# Patient Record
Sex: MALE | Race: WHITE | Employment: FULL TIME | URBAN - METROPOLITAN AREA
[De-identification: names, ages, dates, MRNs, and addresses within clinical notes are randomized per-mention and may not be internally consistent; named-entity substitution may affect disease eponyms.]

---

## 2022-07-26 ENCOUNTER — INITIAL CONSULT (OUTPATIENT)
Dept: CARDIOLOGY CLINIC | Age: 62
End: 2022-07-26
Payer: COMMERCIAL

## 2022-07-26 VITALS
HEIGHT: 74 IN | DIASTOLIC BLOOD PRESSURE: 72 MMHG | BODY MASS INDEX: 34.27 KG/M2 | WEIGHT: 267 LBS | HEART RATE: 66 BPM | SYSTOLIC BLOOD PRESSURE: 170 MMHG

## 2022-07-26 DIAGNOSIS — R07.2 PRECORDIAL PAIN: ICD-10-CM

## 2022-07-26 DIAGNOSIS — I49.3 PVC'S (PREMATURE VENTRICULAR CONTRACTIONS): ICD-10-CM

## 2022-07-26 DIAGNOSIS — I10 ESSENTIAL HYPERTENSION, BENIGN: ICD-10-CM

## 2022-07-26 DIAGNOSIS — R00.1 BRADYCARDIA: ICD-10-CM

## 2022-07-26 DIAGNOSIS — Z76.89 ESTABLISHING CARE WITH NEW DOCTOR, ENCOUNTER FOR: Primary | ICD-10-CM

## 2022-07-26 PROCEDURE — 93000 ELECTROCARDIOGRAM COMPLETE: CPT | Performed by: INTERNAL MEDICINE

## 2022-07-26 PROCEDURE — 99205 OFFICE O/P NEW HI 60 MIN: CPT | Performed by: INTERNAL MEDICINE

## 2022-07-26 RX ORDER — METFORMIN HYDROCHLORIDE 500 MG/1
TABLET, EXTENDED RELEASE ORAL
COMMUNITY
Start: 2022-07-19

## 2022-07-26 RX ORDER — FLUTICASONE PROPIONATE 50 MCG
SPRAY, SUSPENSION (ML) NASAL
COMMUNITY
Start: 2022-05-02

## 2022-07-26 RX ORDER — LISINOPRIL 10 MG/1
TABLET ORAL
COMMUNITY
Start: 2022-05-25

## 2022-07-26 RX ORDER — MONTELUKAST SODIUM 10 MG/1
10 TABLET ORAL DAILY
COMMUNITY
Start: 2021-08-30

## 2022-07-26 RX ORDER — METOPROLOL SUCCINATE 25 MG/1
TABLET, EXTENDED RELEASE ORAL
COMMUNITY
Start: 2022-07-21 | End: 2022-09-19 | Stop reason: SDUPTHER

## 2022-07-26 RX ORDER — AMLODIPINE BESYLATE 10 MG/1
TABLET ORAL
COMMUNITY
Start: 2022-07-06

## 2022-07-26 RX ORDER — LORATADINE AND PSEUDOEPHEDRINE 10; 240 MG/1; MG/1
1 TABLET, EXTENDED RELEASE ORAL
COMMUNITY
End: 2022-09-12

## 2022-07-26 NOTE — PROGRESS NOTES
[facility] CARDIOLOGY  [address], [address]  PHONE: [phone]         CONSULT        22      NAME:  Maria Isabel Stephens  : 1960  MRN: [id number]      SUBJECTIVE:   Maria Isabel Stephens is a 60 y.o. male seen for a consultation visit regarding the following:     Chief Complaint   Patient presents with    Consultation     Bradycardia            HPI:    3months 69-year-old gentleman at [facility] physical found to have low heart rate and frequent PVCs multiple EKGs reviewed show frequent PVCs and at times be bigeminy with rare couplets. He has had no dizziness lightheadedness or syncope. Metoprolol was started and EKG was repeated today and he is still in ventricular bigeminy. He has no syncope. He has had some chest pain for the last week. It is retrosternal lasting hours at a time both with exertion and at rest.  There is no associated symptoms. He thinks its been present since starting the metoprolol. Allergies   Allergen Reactions    Aspirin Anaphylaxis    Penicillins Anaphylaxis, Hives and Rash    Sulfa Antibiotics Hives     Past Medical History:   Diagnosis Date    Hypertension      No past surgical history on file. Family History   Problem Relation Age of Onset    Heart Attack Paternal Grandmother 28     Social History     Tobacco Use    Smoking status: Never    Smokeless tobacco: Never   Substance Use Topics    Alcohol use: Not on file           ROS:    Constitution: Negative for fever. Eyes: Negative for blurred vision. Respiratory: Negative for cough. Endocrine: Negative for cold intolerance and heat intolerance. Skin: Negative for rash. Musculoskeletal: Negative for myalgias. Gastrointestinal: Negative for diarrhea, nausea and vomiting. Genitourinary: Negative for dysuria. Neurological: Negative for headaches and numbness.           PHYSICAL EXAM:     BP (!) 170/72   Pulse 66   Ht 6' 2\" (1.88 m)   Wt 267 lb (121.1 kg)   BMI 34.28 kg/m²    Constitutional: Oriented to person, place, and time. Appears well-developed and well-nourished. Head: Normocephalic and atraumatic. Neck: Neck supple. Cardiovascular: Normal rate and regular rhythm with no murmur -No JVP  Pulmonary/Chest: Breath sounds normal.   Abdominal: Soft. Musculoskeletal: No edema. Neurological: Alert and oriented to person, place, and time. Skin: Skin is warm and dry. Psychiatric: Normal mood and affect. Vitals reviewed        Medical problems and test results were reviewed with the patient today. Wt Readings from Last 3 Encounters:   07/26/22 267 lb (121.1 kg)      Outside records- nsr on ekg with frequent pvcs- followed htn    No results found for this or any previous visit (from the past 672 hour(s)). No results found for: CHOL, CHOLPOCT, CHOLX, CHLST, CHOLV, HDL, HDLPOC, HDLC, LDL, LDLC, VLDLC, VLDL, TGLX, TRIGL    Ekg-Sinus  Rhythm  -Frequent pvcs -ventricular bigeminy   hr 66    ASSESSMENT and PLAN        Frequent PVCs. Given the chest pain we will perform a nuclear stress test as well. Echocardiogram to ensure he does not have a PVC induced myopathy. If ejection fraction is normal we will start flecainide 50 mg twice daily and then place a monitor after 1 month to see response to therapy. If he is still having frequent PVCs will need EP evaluation at that time. Thank you for allowing me to participate in this patient's care. Please call or contact me if there are any questions or concerns regarding the above.       Arely Valdez MD  07/26/22  11:40 AM

## 2022-09-12 ENCOUNTER — OFFICE VISIT (OUTPATIENT)
Dept: CARDIOLOGY CLINIC | Age: 62
End: 2022-09-12
Payer: COMMERCIAL

## 2022-09-12 VITALS
DIASTOLIC BLOOD PRESSURE: 70 MMHG | BODY MASS INDEX: 33.62 KG/M2 | HEIGHT: 74 IN | WEIGHT: 262 LBS | SYSTOLIC BLOOD PRESSURE: 134 MMHG | HEART RATE: 60 BPM

## 2022-09-12 DIAGNOSIS — I10 ESSENTIAL HYPERTENSION, BENIGN: ICD-10-CM

## 2022-09-12 DIAGNOSIS — I49.3 PVC (PREMATURE VENTRICULAR CONTRACTION): Primary | ICD-10-CM

## 2022-09-12 DIAGNOSIS — R00.1 BRADYCARDIA: ICD-10-CM

## 2022-09-12 PROCEDURE — 99214 OFFICE O/P EST MOD 30 MIN: CPT | Performed by: INTERNAL MEDICINE

## 2022-09-12 RX ORDER — FLECAINIDE ACETATE 50 MG/1
50 TABLET ORAL 2 TIMES DAILY
Qty: 60 TABLET | Refills: 11 | Status: SHIPPED | OUTPATIENT
Start: 2022-09-12

## 2022-09-12 RX ORDER — LATANOPROST 50 UG/ML
1 SOLUTION/ DROPS OPHTHALMIC NIGHTLY
COMMUNITY

## 2022-09-12 RX ORDER — LEVOCETIRIZINE DIHYDROCHLORIDE 5 MG/1
10 TABLET, FILM COATED ORAL NIGHTLY
COMMUNITY

## 2022-09-12 RX ORDER — SENNOSIDES 8.6 MG
650 CAPSULE ORAL EVERY 8 HOURS PRN
COMMUNITY

## 2022-09-12 RX ORDER — DIPHENHYDRAMINE HCL 25 MG
25 TABLET ORAL EVERY EVENING
COMMUNITY

## 2022-09-12 NOTE — PROGRESS NOTES
[facility] CARDIOLOGY  [address], [address]  PHONE: [phone]      22    NAME:  Melodie Velarde  : 1960  MRN: [id number]       SUBJECTIVE:   Melodie Velarde is a 60 y.o. male seen for a follow up visit regarding the following:     No chief complaint on file. HPI:  Some cp since staring lopressor- normal nuc. No pacheco. No orthopnea or pnd. No palpitations or syncope. Past Medical History, Past Surgical History, Family history, Social History, and Medications were all reviewed with the patient today and updated as necessary. Current Outpatient Medications   Medication Sig Dispense Refill    levocetirizine (XYZAL) 5 MG tablet Take 10 mg by mouth nightly      diphenhydrAMINE (BENADRYL) 25 MG tablet Take 25 mg by mouth every evening      acetaminophen (TYLENOL 8 HOUR ARTHRITIS PAIN) 650 MG extended release tablet Take 650 mg by mouth every 8 hours as needed for Pain      latanoprost (XALATAN) 0.005 % ophthalmic solution 1 drop nightly      lisinopril (PRINIVIL;ZESTRIL) 10 MG tablet TAKE 1 TABLET (10 MG) BY MOUTH DAILY. montelukast (SINGULAIR) 10 MG tablet Take 10 mg by mouth in the morning. fluticasone (FLONASE) 50 MCG/ACT nasal spray by Nasal route      amLODIPine (NORVASC) 10 MG tablet TAKE 1 TABLET (10 MG) BY MOUTH DAILY. metFORMIN (GLUCOPHAGE-XR) 500 MG extended release tablet TAKE 1 TABLET BY MOUTH EVERY DAY      metoprolol succinate (TOPROL XL) 25 MG extended release tablet TAKE 1 TABLET BY MOUTH EVERY DAY FOR 30 DAYS       No current facility-administered medications for this visit. Social History     Tobacco Use    Smoking status: Never    Smokeless tobacco: Never   Substance Use Topics    Alcohol use: Not on file              PHYSICAL EXAM:   /70   Pulse 60   Ht 6' 2\" (1.88 m)   Wt 262 lb (118.8 kg)   BMI 33.64 kg/m²    Constitutional: Oriented to person, place, and time.  Appears well-developed and well-nourished. Head: Normocephalic and atraumatic. Neck: Neck supple. Cardiovascular: Normal rate and regular rhythm with ectopy with no murmur -No JVP  Pulmonary/Chest: Breath sounds normal.   Abdominal: Soft. Musculoskeletal: No edema. Neurological: Alert and oriented to person, place, and time. Skin: Skin is warm and dry. Psychiatric: Normal mood and affect. Vitals reviewed. Wt Readings from Last 3 Encounters:   09/12/22 262 lb (118.8 kg)   09/06/22 267 lb (121.1 kg)   07/26/22 267 lb (121.1 kg)       Medical problems and test results were reviewed with the patient today. ASSESSMENT and PLAN    1. PVC (premature ventricular contraction)  Frequent- normal lvef- no response to lopressor  Start flecainide 50mg bid and place a Holter in a month  He has  fatigue with lopressor    2. Essential hypertension, benign  Stable. Continue lisinopril    3. Bradycardia  Resolved. Continue current meds         Return in about 4 weeks (around 10/10/2022).          Maxine Dobson MD  9/12/2022  4:10 PM

## 2022-10-27 ENCOUNTER — OFFICE VISIT (OUTPATIENT)
Dept: CARDIOLOGY CLINIC | Age: 62
End: 2022-10-27
Payer: COMMERCIAL

## 2022-10-27 VITALS
SYSTOLIC BLOOD PRESSURE: 130 MMHG | DIASTOLIC BLOOD PRESSURE: 70 MMHG | WEIGHT: 270 LBS | HEART RATE: 53 BPM | HEIGHT: 73 IN | BODY MASS INDEX: 35.78 KG/M2

## 2022-10-27 DIAGNOSIS — I49.3 PVC'S (PREMATURE VENTRICULAR CONTRACTIONS): ICD-10-CM

## 2022-10-27 DIAGNOSIS — I10 ESSENTIAL HYPERTENSION, BENIGN: Primary | ICD-10-CM

## 2022-10-27 DIAGNOSIS — R00.1 BRADYCARDIA: ICD-10-CM

## 2022-10-27 PROCEDURE — 99214 OFFICE O/P EST MOD 30 MIN: CPT | Performed by: INTERNAL MEDICINE

## 2022-10-27 PROCEDURE — 3075F SYST BP GE 130 - 139MM HG: CPT | Performed by: INTERNAL MEDICINE

## 2022-10-27 PROCEDURE — 3078F DIAST BP <80 MM HG: CPT | Performed by: INTERNAL MEDICINE

## 2022-10-27 PROCEDURE — 93000 ELECTROCARDIOGRAM COMPLETE: CPT | Performed by: INTERNAL MEDICINE

## 2022-10-27 NOTE — PROGRESS NOTES
[facility] CARDIOLOGY  [address], [address], [address] [place] [address]  PHONE: [phone]      10/27/22    NAME:  Sarah Jacobsen  : 1960  MRN: [id number]       SUBJECTIVE:   Sarah Jacobsen is a 60 y.o. male seen for a follow up visit regarding the following:     Chief Complaint   Patient presents with    Irregular Heart Beat         HPI:    No cp or pacheco. No orthopnea or pnd. No palpitations or syncope. Palpitations much better in the last 2 weeks on flecinide. Past Medical History, Past Surgical History, Family history, Social History, and Medications were all reviewed with the patient today and updated as necessary. Current Outpatient Medications   Medication Sig Dispense Refill    diclofenac (VOLTAREN) 50 MG EC tablet Take 50 mg by mouth as needed for Pain      metoprolol succinate (TOPROL XL) 25 MG extended release tablet TAKE 1 TABLET BY MOUTH EVERY DAY FOR 30 DAYS 30 tablet 11    levocetirizine (XYZAL) 5 MG tablet Take 10 mg by mouth nightly      diphenhydrAMINE (BENADRYL) 25 MG tablet Take 25 mg by mouth every evening      acetaminophen (TYLENOL) 650 MG extended release tablet Take 650 mg by mouth every 8 hours as needed for Pain      latanoprost (XALATAN) 0.005 % ophthalmic solution 1 drop nightly      flecainide (TAMBOCOR) 50 MG tablet Take 1 tablet by mouth 2 times daily 60 tablet 11    lisinopril (PRINIVIL;ZESTRIL) 10 MG tablet TAKE 1 TABLET (10 MG) BY MOUTH DAILY. montelukast (SINGULAIR) 10 MG tablet Take 10 mg by mouth in the morning. fluticasone (FLONASE) 50 MCG/ACT nasal spray by Nasal route      amLODIPine (NORVASC) 10 MG tablet TAKE 1 TABLET (10 MG) BY MOUTH DAILY. metFORMIN (GLUCOPHAGE-XR) 500 MG extended release tablet TAKE 1 TABLET BY MOUTH EVERY DAY       No current facility-administered medications for this visit.                Social History     Tobacco Use    Smoking status: Never    Smokeless tobacco: Never   Substance Use Topics    Alcohol use: Not on file              PHYSICAL EXAM:   /70   Pulse 53   Ht 6' 1\" (1.854 m)   Wt 270 lb (122.5 kg)   BMI 35.62 kg/m²    Constitutional: Oriented to person, place, and time. Appears well-developed and well-nourished. Head: Normocephalic and atraumatic. Neck: Neck supple. Cardiovascular: Normal rate and regular rhythm with no murmur -No JVP  Pulmonary/Chest: Breath sounds normal.   Abdominal: Soft. Musculoskeletal: No edema. Neurological: Alert and oriented to person, place, and time. Skin: Skin is warm and dry. Psychiatric: Normal mood and affect. Vitals reviewed. Wt Readings from Last 3 Encounters:   10/27/22 270 lb (122.5 kg)   09/12/22 262 lb (118.8 kg)   09/06/22 267 lb (121.1 kg)       Medical problems and test results were reviewed with the patient today. ASSESSMENT and PLAN    1. Essential hypertension, benign  Stable. Continue toprol      2. PVC's (premature ventricular contractions)  Improved with current therapy. Will continue medications  Continue fecainide and toprol and Holter to count pvcs on therapy  - EKG 12 Lead  - Cardiac holter monitor (<= 48 hours); Future    3. Bradycardia  Improved with current therapy. Will continue medications         Return in about 3 months (around 1/27/2023).          Carter Nissen, MD  10/27/2022  4:12 PM

## 2023-01-31 ENCOUNTER — OFFICE VISIT (OUTPATIENT)
Dept: CARDIOLOGY CLINIC | Age: 63
End: 2023-01-31
Payer: COMMERCIAL

## 2023-01-31 VITALS
SYSTOLIC BLOOD PRESSURE: 140 MMHG | HEART RATE: 52 BPM | HEIGHT: 73 IN | BODY MASS INDEX: 36.71 KG/M2 | WEIGHT: 277 LBS | DIASTOLIC BLOOD PRESSURE: 60 MMHG

## 2023-01-31 DIAGNOSIS — R00.1 BRADYCARDIA: Primary | ICD-10-CM

## 2023-01-31 DIAGNOSIS — R07.1 CHEST PAIN ON BREATHING: ICD-10-CM

## 2023-01-31 DIAGNOSIS — I49.3 PVC'S (PREMATURE VENTRICULAR CONTRACTIONS): ICD-10-CM

## 2023-01-31 DIAGNOSIS — I10 ESSENTIAL HYPERTENSION, BENIGN: ICD-10-CM

## 2023-01-31 PROCEDURE — 99214 OFFICE O/P EST MOD 30 MIN: CPT | Performed by: INTERNAL MEDICINE

## 2023-01-31 PROCEDURE — 3077F SYST BP >= 140 MM HG: CPT | Performed by: INTERNAL MEDICINE

## 2023-01-31 PROCEDURE — 3078F DIAST BP <80 MM HG: CPT | Performed by: INTERNAL MEDICINE

## 2023-01-31 NOTE — PROGRESS NOTES
[facility] CARDIOLOGY  [address] [address], [address]  PHONE: [phone]      23    NAME:  Sarah Jacobsen  : 1960  MRN: [id number]       SUBJECTIVE:   Sarah Jacobsen is a 60 y.o. male seen for a follow up visit regarding the following:     Chief Complaint   Patient presents with    Hypertension    Irregular Heart Beat         HPI:    No pacheco. No orthopnea or pnd. No syncope. Exertional chest pain. Tightness at extremes. Increased in the last 1 week. Rare palpitations    Past Medical History, Past Surgical History, Family history, Social History, and Medications were all reviewed with the patient today and updated as necessary. Current Outpatient Medications   Medication Sig Dispense Refill    diclofenac (VOLTAREN) 50 MG EC tablet Take 50 mg by mouth as needed for Pain      metoprolol succinate (TOPROL XL) 25 MG extended release tablet TAKE 1 TABLET BY MOUTH EVERY DAY FOR 30 DAYS 30 tablet 11    levocetirizine (XYZAL) 5 MG tablet Take 10 mg by mouth nightly      diphenhydrAMINE (BENADRYL) 25 MG tablet Take 25 mg by mouth every evening      acetaminophen (TYLENOL) 650 MG extended release tablet Take 650 mg by mouth every 8 hours as needed for Pain      latanoprost (XALATAN) 0.005 % ophthalmic solution 1 drop nightly      flecainide (TAMBOCOR) 50 MG tablet Take 1 tablet by mouth 2 times daily 60 tablet 11    lisinopril (PRINIVIL;ZESTRIL) 10 MG tablet TAKE 1 TABLET (10 MG) BY MOUTH DAILY. montelukast (SINGULAIR) 10 MG tablet Take 10 mg by mouth in the morning. fluticasone (FLONASE) 50 MCG/ACT nasal spray by Nasal route      amLODIPine (NORVASC) 10 MG tablet TAKE 1 TABLET (10 MG) BY MOUTH DAILY. metFORMIN (GLUCOPHAGE-XR) 500 MG extended release tablet TAKE 1 TABLET BY MOUTH EVERY DAY       No current facility-administered medications for this visit.                Social History     Tobacco Use    Smoking status: Never    Smokeless tobacco: Never   Substance Use Topics    Alcohol use: Not on file              PHYSICAL EXAM:   BP (!) 140/60   Pulse 52   Ht 6' 1\" (1.854 m)   Wt 277 lb (125.6 kg)   BMI 36.55 kg/m²    Constitutional: Oriented to person, place, and time. Appears well-developed and well-nourished. Head: Normocephalic and atraumatic. Neck: Neck supple. Cardiovascular: Normal rate and regular rhythm with no murmur -No JVP  Pulmonary/Chest: Breath sounds normal.   Abdominal: Soft. Musculoskeletal: No edema. Neurological: Alert and oriented to person, place, and time. Skin: Skin is warm and dry. Psychiatric: Normal mood and affect. Vitals reviewed. Wt Readings from Last 3 Encounters:   01/31/23 277 lb (125.6 kg)   10/27/22 270 lb (122.5 kg)   09/12/22 262 lb (118.8 kg)       Medical problems and test results were reviewed with the patient today. ASSESSMENT and PLAN    1. Bradycardia  Stable. Continue current medical therapy. 2. PVC's (premature ventricular contractions)  Improved with current therapy. Will continue medications  Continue toprol and flecainide  2500/24 hours    3. Essential hypertension, benign  Stable. Continue toprol      4. Chest pain on exertional  Worse- normal nuc 9/2/23- offered cath- he defers- if symptoms worsen we will proceed       Return in about 3 months (around 4/30/2023).          Trav Heard MD  1/31/2023  4:33 PM

## 2023-05-01 ENCOUNTER — OFFICE VISIT (OUTPATIENT)
Dept: CARDIOLOGY CLINIC | Age: 63
End: 2023-05-01
Payer: COMMERCIAL

## 2023-05-01 VITALS
HEART RATE: 46 BPM | SYSTOLIC BLOOD PRESSURE: 152 MMHG | WEIGHT: 277 LBS | DIASTOLIC BLOOD PRESSURE: 102 MMHG | BODY MASS INDEX: 36.71 KG/M2 | HEIGHT: 73 IN

## 2023-05-01 DIAGNOSIS — I49.3 PVC'S (PREMATURE VENTRICULAR CONTRACTIONS): ICD-10-CM

## 2023-05-01 DIAGNOSIS — R07.1 CHEST PAIN ON BREATHING: ICD-10-CM

## 2023-05-01 DIAGNOSIS — I10 ESSENTIAL HYPERTENSION, BENIGN: ICD-10-CM

## 2023-05-01 DIAGNOSIS — R00.1 BRADYCARDIA: Primary | ICD-10-CM

## 2023-05-01 PROCEDURE — 3077F SYST BP >= 140 MM HG: CPT | Performed by: INTERNAL MEDICINE

## 2023-05-01 PROCEDURE — 3080F DIAST BP >= 90 MM HG: CPT | Performed by: INTERNAL MEDICINE

## 2023-05-01 PROCEDURE — 99214 OFFICE O/P EST MOD 30 MIN: CPT | Performed by: INTERNAL MEDICINE

## 2023-05-01 RX ORDER — LISINOPRIL 20 MG/1
20 TABLET ORAL DAILY
Qty: 30 TABLET | Refills: 11 | Status: SHIPPED | OUTPATIENT
Start: 2023-05-01

## 2023-05-01 NOTE — PROGRESS NOTES
[facility] CARDIOLOGY  [address], [address]  PHONE: [phone]      23    NAME:  Abril Castillo  : 1960  MRN: [id number]       SUBJECTIVE:   Abril Castillo is a 58 y.o. male seen for a follow up visit regarding the following:     Chief Complaint   Patient presents with    Hypertension         HPI:    No cp or pacheco. No orthopnea or pnd. No palpitations or syncope. Past Medical History, Past Surgical History, Family history, Social History, and Medications were all reviewed with the patient today and updated as necessary. Current Outpatient Medications   Medication Sig Dispense Refill    diclofenac (VOLTAREN) 50 MG EC tablet Take 1 tablet by mouth as needed for Pain      metoprolol succinate (TOPROL XL) 25 MG extended release tablet TAKE 1 TABLET BY MOUTH EVERY DAY FOR 30 DAYS 30 tablet 11    levocetirizine (XYZAL) 5 MG tablet Take 2 tablets by mouth nightly      diphenhydrAMINE (BENADRYL) 25 MG tablet Take 1 tablet by mouth every evening      acetaminophen (TYLENOL) 650 MG extended release tablet Take 1 tablet by mouth every 8 hours as needed for Pain      latanoprost (XALATAN) 0.005 % ophthalmic solution 1 drop nightly      flecainide (TAMBOCOR) 50 MG tablet Take 1 tablet by mouth 2 times daily 60 tablet 11    lisinopril (PRINIVIL;ZESTRIL) 10 MG tablet TAKE 1 TABLET (10 MG) BY MOUTH DAILY. montelukast (SINGULAIR) 10 MG tablet Take 1 tablet by mouth daily      fluticasone (FLONASE) 50 MCG/ACT nasal spray by Nasal route      amLODIPine (NORVASC) 10 MG tablet TAKE 1 TABLET (10 MG) BY MOUTH DAILY. metFORMIN (GLUCOPHAGE-XR) 500 MG extended release tablet 250 mg       No current facility-administered medications for this visit.                Social History     Tobacco Use    Smoking status: Never    Smokeless tobacco: Never   Substance Use Topics    Alcohol use: Not on file              PHYSICAL EXAM:   BP (!) 152/102   Pulse (!) 46   Ht 6' 1\"

## 2023-08-23 RX ORDER — FLECAINIDE ACETATE 50 MG/1
TABLET ORAL
Qty: 180 TABLET | Refills: 3 | Status: SHIPPED | OUTPATIENT
Start: 2023-08-23

## 2023-09-06 RX ORDER — METOPROLOL SUCCINATE 25 MG/1
TABLET, EXTENDED RELEASE ORAL
Qty: 90 TABLET | Refills: 3 | Status: SHIPPED | OUTPATIENT
Start: 2023-09-06

## 2023-09-06 NOTE — TELEPHONE ENCOUNTER
Requested Prescriptions     Pending Prescriptions Disp Refills    metoprolol succinate (TOPROL XL) 25 MG extended release tablet [Pharmacy Med Name: METOPROLOL SUCC ER 25 MG TAB] 90 tablet 3     Sig: TAKE 1 TABLET BY MOUTH EVERY DAY

## 2023-11-07 ENCOUNTER — OFFICE VISIT (OUTPATIENT)
Age: 63
End: 2023-11-07
Payer: COMMERCIAL

## 2023-11-07 VITALS
BODY MASS INDEX: 36.02 KG/M2 | DIASTOLIC BLOOD PRESSURE: 86 MMHG | HEART RATE: 60 BPM | SYSTOLIC BLOOD PRESSURE: 138 MMHG | WEIGHT: 273 LBS

## 2023-11-07 DIAGNOSIS — I10 ESSENTIAL HYPERTENSION, BENIGN: ICD-10-CM

## 2023-11-07 DIAGNOSIS — I49.3 PVC'S (PREMATURE VENTRICULAR CONTRACTIONS): ICD-10-CM

## 2023-11-07 DIAGNOSIS — R00.1 BRADYCARDIA: Primary | ICD-10-CM

## 2023-11-07 PROCEDURE — 3075F SYST BP GE 130 - 139MM HG: CPT | Performed by: INTERNAL MEDICINE

## 2023-11-07 PROCEDURE — 99214 OFFICE O/P EST MOD 30 MIN: CPT | Performed by: INTERNAL MEDICINE

## 2023-11-07 PROCEDURE — 93000 ELECTROCARDIOGRAM COMPLETE: CPT | Performed by: INTERNAL MEDICINE

## 2023-11-07 PROCEDURE — 3079F DIAST BP 80-89 MM HG: CPT | Performed by: INTERNAL MEDICINE

## 2024-03-22 RX ORDER — LISINOPRIL 20 MG/1
20 TABLET ORAL DAILY
Qty: 90 TABLET | Refills: 3 | Status: SHIPPED | OUTPATIENT
Start: 2024-03-22

## 2024-03-22 NOTE — TELEPHONE ENCOUNTER
Requested Prescriptions     Signed Prescriptions Disp Refills    lisinopril (PRINIVIL;ZESTRIL) 20 MG tablet 90 tablet 3     Sig: TAKE 1 TABLET BY MOUTH EVERY DAY     Authorizing Provider: FIDE CARMICHAEL     Ordering User: ROSSY SUTTON       Rx verified.